# Patient Record
Sex: FEMALE | Race: WHITE | NOT HISPANIC OR LATINO | URBAN - METROPOLITAN AREA
[De-identification: names, ages, dates, MRNs, and addresses within clinical notes are randomized per-mention and may not be internally consistent; named-entity substitution may affect disease eponyms.]

---

## 2019-03-16 ENCOUNTER — INPATIENT (INPATIENT)
Facility: HOSPITAL | Age: 74
LOS: 0 days | Discharge: HOME | DRG: 440 | End: 2019-03-17
Attending: HOSPITALIST | Admitting: HOSPITALIST
Payer: COMMERCIAL

## 2019-03-16 VITALS
DIASTOLIC BLOOD PRESSURE: 76 MMHG | RESPIRATION RATE: 18 BRPM | TEMPERATURE: 98 F | OXYGEN SATURATION: 95 % | HEART RATE: 100 BPM | SYSTOLIC BLOOD PRESSURE: 126 MMHG

## 2019-03-16 DIAGNOSIS — Z90.49 ACQUIRED ABSENCE OF OTHER SPECIFIED PARTS OF DIGESTIVE TRACT: Chronic | ICD-10-CM

## 2019-03-16 DIAGNOSIS — K85.90 ACUTE PANCREATITIS WITHOUT NECROSIS OR INFECTION, UNSPECIFIED: ICD-10-CM

## 2019-03-16 DIAGNOSIS — E11.9 TYPE 2 DIABETES MELLITUS WITHOUT COMPLICATIONS: ICD-10-CM

## 2019-03-16 DIAGNOSIS — Z29.9 ENCOUNTER FOR PROPHYLACTIC MEASURES, UNSPECIFIED: ICD-10-CM

## 2019-03-16 DIAGNOSIS — R63.8 OTHER SYMPTOMS AND SIGNS CONCERNING FOOD AND FLUID INTAKE: ICD-10-CM

## 2019-03-16 DIAGNOSIS — Z91.89 OTHER SPECIFIED PERSONAL RISK FACTORS, NOT ELSEWHERE CLASSIFIED: ICD-10-CM

## 2019-03-16 DIAGNOSIS — J43.9 EMPHYSEMA, UNSPECIFIED: ICD-10-CM

## 2019-03-16 LAB
ALBUMIN SERPL ELPH-MCNC: 3.2 G/DL — LOW (ref 3.4–5)
ALP SERPL-CCNC: 71 U/L — SIGNIFICANT CHANGE UP (ref 40–120)
ALT FLD-CCNC: 39 U/L — SIGNIFICANT CHANGE UP (ref 12–42)
ANION GAP SERPL CALC-SCNC: 12 MMOL/L — SIGNIFICANT CHANGE UP (ref 9–16)
APPEARANCE UR: CLEAR — SIGNIFICANT CHANGE UP
AST SERPL-CCNC: 37 U/L — SIGNIFICANT CHANGE UP (ref 15–37)
BASOPHILS NFR BLD AUTO: 0.3 % — SIGNIFICANT CHANGE UP (ref 0–2)
BILIRUB SERPL-MCNC: 0.8 MG/DL — SIGNIFICANT CHANGE UP (ref 0.2–1.2)
BILIRUB UR-MCNC: NEGATIVE — SIGNIFICANT CHANGE UP
BUN SERPL-MCNC: 23 MG/DL — SIGNIFICANT CHANGE UP (ref 7–23)
CALCIUM SERPL-MCNC: 8.6 MG/DL — SIGNIFICANT CHANGE UP (ref 8.5–10.5)
CHLORIDE SERPL-SCNC: 98 MMOL/L — SIGNIFICANT CHANGE UP (ref 96–108)
CO2 SERPL-SCNC: 22 MMOL/L — SIGNIFICANT CHANGE UP (ref 22–31)
COLOR SPEC: YELLOW — SIGNIFICANT CHANGE UP
CREAT SERPL-MCNC: 0.85 MG/DL — SIGNIFICANT CHANGE UP (ref 0.5–1.3)
DIFF PNL FLD: NEGATIVE — SIGNIFICANT CHANGE UP
EOSINOPHIL NFR BLD AUTO: 0.2 % — SIGNIFICANT CHANGE UP (ref 0–6)
GLUCOSE SERPL-MCNC: 363 MG/DL — HIGH (ref 70–99)
GLUCOSE UR QL: >=1000
HCT VFR BLD CALC: 49.8 % — HIGH (ref 34.5–45)
HGB BLD-MCNC: 16.2 G/DL — HIGH (ref 11.5–15.5)
IMM GRANULOCYTES NFR BLD AUTO: 0.5 % — SIGNIFICANT CHANGE UP (ref 0–1.5)
KETONES UR-MCNC: NEGATIVE — SIGNIFICANT CHANGE UP
LEUKOCYTE ESTERASE UR-ACNC: NEGATIVE — SIGNIFICANT CHANGE UP
LIDOCAIN IGE QN: >1500 U/L — HIGH (ref 73–393)
LYMPHOCYTES # BLD AUTO: 8.8 % — LOW (ref 13–44)
MAGNESIUM SERPL-MCNC: 1.6 MG/DL — SIGNIFICANT CHANGE UP (ref 1.6–2.6)
MCHC RBC-ENTMCNC: 27.8 PG — SIGNIFICANT CHANGE UP (ref 27–34)
MCHC RBC-ENTMCNC: 32.5 G/DL — SIGNIFICANT CHANGE UP (ref 32–36)
MCV RBC AUTO: 85.4 FL — SIGNIFICANT CHANGE UP (ref 80–100)
MONOCYTES NFR BLD AUTO: 5.6 % — SIGNIFICANT CHANGE UP (ref 2–14)
NEUTROPHILS NFR BLD AUTO: 84.6 % — HIGH (ref 43–77)
NITRITE UR-MCNC: NEGATIVE — SIGNIFICANT CHANGE UP
PH UR: 5.5 — SIGNIFICANT CHANGE UP (ref 5–8)
PLATELET # BLD AUTO: 132 K/UL — LOW (ref 150–400)
POTASSIUM SERPL-MCNC: 4.3 MMOL/L — SIGNIFICANT CHANGE UP (ref 3.5–5.3)
POTASSIUM SERPL-SCNC: 4.3 MMOL/L — SIGNIFICANT CHANGE UP (ref 3.5–5.3)
PROT SERPL-MCNC: 8.1 G/DL — SIGNIFICANT CHANGE UP (ref 6.4–8.2)
PROT UR-MCNC: 30 MG/DL
RBC # BLD: 5.83 M/UL — HIGH (ref 3.8–5.2)
RBC # FLD: 13.1 % — SIGNIFICANT CHANGE UP (ref 10.3–14.5)
SODIUM SERPL-SCNC: 132 MMOL/L — SIGNIFICANT CHANGE UP (ref 132–145)
SP GR SPEC: 1.02 — SIGNIFICANT CHANGE UP (ref 1–1.03)
UROBILINOGEN FLD QL: 0.2 E.U./DL — SIGNIFICANT CHANGE UP
WBC # BLD: 6.6 K/UL — SIGNIFICANT CHANGE UP (ref 3.8–10.5)
WBC # FLD AUTO: 6.6 K/UL — SIGNIFICANT CHANGE UP (ref 3.8–10.5)

## 2019-03-16 PROCEDURE — 76705 ECHO EXAM OF ABDOMEN: CPT | Mod: 26

## 2019-03-16 PROCEDURE — 99285 EMERGENCY DEPT VISIT HI MDM: CPT

## 2019-03-16 PROCEDURE — 99223 1ST HOSP IP/OBS HIGH 75: CPT | Mod: GC

## 2019-03-16 PROCEDURE — 74177 CT ABD & PELVIS W/CONTRAST: CPT | Mod: 26

## 2019-03-16 RX ORDER — KETOROLAC TROMETHAMINE 30 MG/ML
15 SYRINGE (ML) INJECTION ONCE
Qty: 0 | Refills: 0 | Status: DISCONTINUED | OUTPATIENT
Start: 2019-03-16 | End: 2019-03-16

## 2019-03-16 RX ORDER — ONDANSETRON 8 MG/1
4 TABLET, FILM COATED ORAL ONCE
Qty: 0 | Refills: 0 | Status: COMPLETED | OUTPATIENT
Start: 2019-03-16 | End: 2019-03-16

## 2019-03-16 RX ORDER — SODIUM CHLORIDE 9 MG/ML
1000 INJECTION, SOLUTION INTRAVENOUS
Qty: 0 | Refills: 0 | Status: DISCONTINUED | OUTPATIENT
Start: 2019-03-16 | End: 2019-03-17

## 2019-03-16 RX ORDER — SODIUM CHLORIDE 9 MG/ML
1000 INJECTION INTRAMUSCULAR; INTRAVENOUS; SUBCUTANEOUS ONCE
Qty: 0 | Refills: 0 | Status: COMPLETED | OUTPATIENT
Start: 2019-03-16 | End: 2019-03-16

## 2019-03-16 RX ORDER — SODIUM CHLORIDE 9 MG/ML
1000 INJECTION INTRAMUSCULAR; INTRAVENOUS; SUBCUTANEOUS
Qty: 0 | Refills: 0 | Status: DISCONTINUED | OUTPATIENT
Start: 2019-03-16 | End: 2019-03-17

## 2019-03-16 RX ORDER — DEXTROSE 50 % IN WATER 50 %
25 SYRINGE (ML) INTRAVENOUS ONCE
Qty: 0 | Refills: 0 | Status: DISCONTINUED | OUTPATIENT
Start: 2019-03-16 | End: 2019-03-17

## 2019-03-16 RX ORDER — DEXTROSE 50 % IN WATER 50 %
15 SYRINGE (ML) INTRAVENOUS ONCE
Qty: 0 | Refills: 0 | Status: DISCONTINUED | OUTPATIENT
Start: 2019-03-16 | End: 2019-03-17

## 2019-03-16 RX ORDER — DEXTROSE 50 % IN WATER 50 %
12.5 SYRINGE (ML) INTRAVENOUS ONCE
Qty: 0 | Refills: 0 | Status: DISCONTINUED | OUTPATIENT
Start: 2019-03-16 | End: 2019-03-17

## 2019-03-16 RX ORDER — HYDROMORPHONE HYDROCHLORIDE 2 MG/ML
1 INJECTION INTRAMUSCULAR; INTRAVENOUS; SUBCUTANEOUS ONCE
Qty: 0 | Refills: 0 | Status: DISCONTINUED | OUTPATIENT
Start: 2019-03-16 | End: 2019-03-16

## 2019-03-16 RX ORDER — MORPHINE SULFATE 50 MG/1
4 CAPSULE, EXTENDED RELEASE ORAL ONCE
Qty: 0 | Refills: 0 | Status: DISCONTINUED | OUTPATIENT
Start: 2019-03-16 | End: 2019-03-16

## 2019-03-16 RX ORDER — INSULIN LISPRO 100/ML
VIAL (ML) SUBCUTANEOUS
Qty: 0 | Refills: 0 | Status: DISCONTINUED | OUTPATIENT
Start: 2019-03-17 | End: 2019-03-17

## 2019-03-16 RX ORDER — METOCLOPRAMIDE HCL 10 MG
10 TABLET ORAL ONCE
Qty: 0 | Refills: 0 | Status: COMPLETED | OUTPATIENT
Start: 2019-03-16 | End: 2019-03-16

## 2019-03-16 RX ORDER — GLUCAGON INJECTION, SOLUTION 0.5 MG/.1ML
1 INJECTION, SOLUTION SUBCUTANEOUS ONCE
Qty: 0 | Refills: 0 | Status: DISCONTINUED | OUTPATIENT
Start: 2019-03-16 | End: 2019-03-17

## 2019-03-16 RX ORDER — HEPARIN SODIUM 5000 [USP'U]/ML
5000 INJECTION INTRAVENOUS; SUBCUTANEOUS EVERY 8 HOURS
Qty: 0 | Refills: 0 | Status: DISCONTINUED | OUTPATIENT
Start: 2019-03-16 | End: 2019-03-17

## 2019-03-16 RX ORDER — IOHEXOL 300 MG/ML
30 INJECTION, SOLUTION INTRAVENOUS ONCE
Qty: 0 | Refills: 0 | Status: COMPLETED | OUTPATIENT
Start: 2019-03-16 | End: 2019-03-16

## 2019-03-16 RX ADMIN — HYDROMORPHONE HYDROCHLORIDE 1 MILLIGRAM(S): 2 INJECTION INTRAMUSCULAR; INTRAVENOUS; SUBCUTANEOUS at 17:32

## 2019-03-16 RX ADMIN — Medication 15 MILLIGRAM(S): at 18:40

## 2019-03-16 RX ADMIN — SODIUM CHLORIDE 2000 MILLILITER(S): 9 INJECTION INTRAMUSCULAR; INTRAVENOUS; SUBCUTANEOUS at 16:12

## 2019-03-16 RX ADMIN — MORPHINE SULFATE 4 MILLIGRAM(S): 50 CAPSULE, EXTENDED RELEASE ORAL at 16:24

## 2019-03-16 RX ADMIN — ONDANSETRON 4 MILLIGRAM(S): 8 TABLET, FILM COATED ORAL at 18:40

## 2019-03-16 RX ADMIN — ONDANSETRON 4 MILLIGRAM(S): 8 TABLET, FILM COATED ORAL at 16:12

## 2019-03-16 RX ADMIN — IOHEXOL 30 MILLILITER(S): 300 INJECTION, SOLUTION INTRAVENOUS at 16:12

## 2019-03-16 RX ADMIN — Medication 15 MILLIGRAM(S): at 16:12

## 2019-03-16 RX ADMIN — SODIUM CHLORIDE 2000 MILLILITER(S): 9 INJECTION INTRAMUSCULAR; INTRAVENOUS; SUBCUTANEOUS at 17:32

## 2019-03-16 RX ADMIN — SODIUM CHLORIDE 300 MILLILITER(S): 9 INJECTION INTRAMUSCULAR; INTRAVENOUS; SUBCUTANEOUS at 23:48

## 2019-03-16 RX ADMIN — Medication 10 MILLIGRAM(S): at 19:21

## 2019-03-16 RX ADMIN — MORPHINE SULFATE 4 MILLIGRAM(S): 50 CAPSULE, EXTENDED RELEASE ORAL at 18:40

## 2019-03-16 RX ADMIN — HYDROMORPHONE HYDROCHLORIDE 1 MILLIGRAM(S): 2 INJECTION INTRAMUSCULAR; INTRAVENOUS; SUBCUTANEOUS at 18:40

## 2019-03-16 NOTE — ED ADULT NURSE NOTE - NSIMPLEMENTINTERV_GEN_ALL_ED
Implemented All Universal Safety Interventions:  [place] to call system. Call bell, personal items and telephone within reach. Instruct patient to call for assistance. Room bathroom lighting operational. Non-slip footwear when patient is off stretcher. Physically safe environment: no spills, clutter or unnecessary equipment. Stretcher in lowest position, wheels locked, appropriate side rails in place.

## 2019-03-16 NOTE — H&P ADULT - NSICDXPROBLEM_GEN_ALL_CORE_FT
PROBLEM DIAGNOSES  Problem: Acute pancreatitis  Assessment and Plan:     Problem: Nutrition, metabolism, and development symptoms  Assessment and Plan: -  -Will replete to K>4 and Mg>2  -  -Dispo RMF    Problem: Need for prophylactic measure  Assessment and Plan: -HSQ  -No GI prophylaxis indicated    Problem: Transition of care performed with sharing of clinical summary  Assessment and Plan: PROBLEM DIAGNOSES  Problem: Diabetes mellitus  Assessment and Plan: -Patient with history of diabetes mellitus, takes  -ISS    Problem: Emphysema lung  Assessment and Plan: -Patient with history of epmhysema, takes    Problem: Acute pancreatitis  Assessment and Plan: -Patient presenting with 1 day of abdominal pain, right sided, lipase greater than 1500  -Patient     Problem: Nutrition, metabolism, and development symptoms  Assessment and Plan: -  -Will replete to K>4 and Mg>2  -  -Dispo RMF    Problem: Need for prophylactic measure  Assessment and Plan: -HSQ  -No GI prophylaxis indicated    Problem: Transition of care performed with sharing of clinical summary  Assessment and Plan: PROBLEM DIAGNOSES  Problem: Acute pancreatitis  Assessment and Plan: -At home today, she had an episode of watery diarrhea so she went to get medicine to stop the diarrhea and when she returned to her apartment, she had another episode.  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  Lipase was greater than 1500 and CT abdomen and pelvis with oral and IV contrast showed questionable interstitial edema and slight prominence of the pancreatic head suggesting acute pancreatitis.  No obstructing stone shown on ultrasound.  No alcohol use.  -Surgery following, will appreciate recs  -NS at 300cc/hour  -Morphine PRN for pain  -Follow up triglycerides    Problem: Right nephrolithiasis  Assessment and Plan: -Patient with history of nonobstructing stone in R kidney but was told NTD at this point.  Causes intermittent R flank pain for her.  Today, states that pain started in abdomen and radiated to back.  No evidence of infection (negative UA, no WBC, no fever), would continue to monitor and encourage follow up with urologist.    Problem: Asthma  Assessment and Plan: -Patient endorses history of asthma after inhaling fumes from 9/11.  Takes 1 pump and 2 inhalers but does not recall the names.  Denies any shortness of breath, is saturating well and not having any wheezing on physical exam.  -Obtain collateral from pharmacy about name of medications (Uses [facility] on [address]) and restart    Problem: Diabetes mellitus  Assessment and Plan: -Patient with history of diabetes mellitus, takes an oral diabetic agent put does not recall name  -ISS  -Consistant carbohydate diet  -Obtain collateral from pharmacy about name of medications (Uses [facility] on [address])    Problem: Nutrition, metabolism, and development symptoms  Assessment and Plan: -NS at 300cc/hour   -Will replete to K>4 and Mg>2  -Patient has not eaten all day, will start with full liquid consistent carbohydrate diet and advance as tolerated  -Dispo RMF  -DNR/DNI    Problem: Need for prophylactic measure  Assessment and Plan: -HSQ  -No GI prophylaxis indicated    Problem: Transition of care performed with sharing of clinical summary  Assessment and Plan: -Patient follows with Dr. Leyva at [facility] (does not recall first name) PROBLEM DIAGNOSES  Problem: Acute pancreatitis  Assessment and Plan: -At home today, patient with multiple episodes of watery diarrhea followed by right sided abdominal pain, sharp, radiating to the back, 10/10 in severity accompanied by about 5 episodes of NBNB vomiting.  Lipase was greater than 1500 and CT abdomen and pelvis with oral and IV contrast showed questionable interstitial edema and slight prominence of the pancreatic head suggesting acute pancreatitis.  No obstructing stone shown on ultrasound.  No alcohol use.  -Surgery following, will appreciate recs  -NS at 300cc/hour  -Morphine PRN for pain  -Follow up triglycerides    Problem: Right nephrolithiasis  Assessment and Plan: -Patient with history of nonobstructing stone in R kidney but was told NTD at this point.  Causes intermittent R flank pain for her.  Today, states that pain started in abdomen and radiated to back.  No evidence of infection (negative UA, no WBC, no fever), would continue to monitor and encourage follow up with urologist.    Problem: Asthma  Assessment and Plan: -Patient endorses history of asthma after inhaling fumes from 9/11.  Takes 1 pump and 2 inhalers but does not recall the names.  Denies any shortness of breath, is saturating well and not having any wheezing on physical exam.  -Obtain collateral from pharmacy about name of medications (Uses [facility] on [address]) and restart    Problem: Diabetes mellitus  Assessment and Plan: -Patient with history of diabetes mellitus, takes an oral diabetic agent put does not recall name  -ISS  -Consistant carbohydate diet  -Obtain collateral from pharmacy about name of medications (Uses [facility] on [address])    Problem: Nutrition, metabolism, and development symptoms  Assessment and Plan: -NS at 300cc/hour   -Will replete to K>4 and Mg>2  -Patient has not eaten all day, will start with full liquid consistent carbohydrate diet and advance as tolerated  -Dispo RMF  -DNR/DNI    Problem: Need for prophylactic measure  Assessment and Plan: -HSQ  -No GI prophylaxis indicated    Problem: Transition of care performed with sharing of clinical summary  Assessment and Plan: -Patient follows with Dr. Leyva at [facility] (does not recall first name) PROBLEM DIAGNOSES  Problem: Acute pancreatitis  Assessment and Plan: -At home today, patient with multiple episodes of watery diarrhea followed by right sided abdominal pain, sharp, radiating to the back, 10/10 in severity accompanied by about 5 episodes of NBNB vomiting.  Lipase was greater than 1500 and CT abdomen and pelvis with oral and IV contrast showed questionable interstitial edema and slight prominence of the pancreatic head suggesting acute pancreatitis.  No obstructing stone shown on ultrasound.  No alcohol use.  -Surgery following, will appreciate recs  -LR at 300cc/hour  -Morphine PRN for pain  -Follow up AM lipid panel  -Obtain collateral about medications, possible iatrogenic cause of pancreatitis    Problem: Right nephrolithiasis  Assessment and Plan: -Patient with history of nonobstructing stone in R kidney but was told NTD at this point.  Causes intermittent R flank pain for her.  Today, states that pain started in abdomen and radiated to back.  No evidence of infection (negative UA, no WBC, no fever), would continue to monitor and encourage follow up with urologist.    Problem: Asthma  Assessment and Plan: -Patient endorses history of asthma after inhaling fumes from 9/11.  Takes 1 pump and 2 inhalers but does not recall the names.  Denies any shortness of breath, is saturating well and not having any wheezing on physical exam.  -Obtain collateral from pharmacy about name of medications (Uses [facility] on [address]) and restart    Problem: Diabetes mellitus  Assessment and Plan: -Patient with history of diabetes mellitus, takes an oral diabetic agent put does not recall name  -ISS  -Consistant carbohydate diet  -Obtain collateral from pharmacy about name of medications (Uses [facility] on [address])    Problem: Thrombocytosis  Assessment and Plan: -Patient found to have platelets of 132 on admission, no signs/symptoms of active bleed, hemodynamically stable  -Trend CBC, transfuse for platelets under 10 or under 50 and bleeding    Problem: Nutrition, metabolism, and development symptoms  Assessment and Plan: -LR at 300cc/hour   -Will replete to K>4 and Mg>2  -Patient has not eaten all day, will start with full liquid consistent carbohydrate diet and advance as tolerated  -Dispo RMF  -DNR/DNI    Problem: Need for prophylactic measure  Assessment and Plan: -HSQ  -No GI prophylaxis indicated    Problem: Transition of care performed with sharing of clinical summary  Assessment and Plan: -Patient follows with Dr. Leyva at [facility] (does not recall first name)

## 2019-03-16 NOTE — H&P ADULT - ASSESSMENT
73 year old female with PMH cholecystectomy, asthma (from inhaling fumes from 9/11), DM and nephrolithiasis who presents with 1 day of right flank pain, right abdominal pain, vomiting and diarrhea.

## 2019-03-16 NOTE — ED PROVIDER NOTE - PHYSICAL EXAMINATION
VITAL SIGNS: I have reviewed nursing notes and confirm.  CONSTITUTIONAL: Well-developed; well-nourished; in no acute distress.  SKIN: Remainder of skin exam is warm and dry, no acute rash.  HEAD: Normocephalic; atraumatic.  EYES: PERRL, EOM intact; conjunctiva and sclera clear.  ENT: No nasal discharge; airway clear.  NECK: Supple; non tender.  CARD: S1, S2 normal; no murmurs, gallops, or rubs. Regular rate and rhythm.  RESP: No wheezes, rales or rhonchi.  ABD: RUQ/RLQ tenderness with no guarding  : Right sided CVA tenderness  EXT: Normal ROM. No clubbing, cyanosis or edema.  NEURO: Alert, oriented. Grossly unremarkable.  PSYCH: Cooperative, appropriate.

## 2019-03-16 NOTE — H&P ADULT - NSHPPHYSICALEXAM_GEN_ALL_CORE
Constitutional: WDWN resting comfortably in bed; NAD  Head: NC/AT  Eyes: PERRL, EOMI, anicteric sclera  ENT: no nasal discharge; uvula midline, no oropharyngeal erythema or exudates; MMM  Neck: supple; no JVD or thyromegaly  Respiratory: CTA B/L; no W/R/R, no retractions  Cardiac: +S1/S2; RRR; no M/R/G; PMI non-displaced  Gastrointestinal: soft, NT/ND; no rebound or guarding; +BSx4  Genitourinary: normal external genitalia  Back: spine midline, no bony tenderness or step-offs; no CVAT B/L  Extremities: WWP, no clubbing or cyanosis; no peripheral edema  Musculoskeletal: NROM x4; no joint swelling, tenderness or erythema  Vascular: 2+ radial, femoral, DP/PT pulses B/L  Dermatologic: skin warm, dry and intact; no rashes, wounds, or scars  Lymphatic: no submandibular or cervical LAD  Neurologic: AAOx3; CNII-XII grossly intact; no focal deficits  Psychiatric: affect and characteristics of appearance, verbalizations, behaviors are appropriate Constitutional: WDWN resting comfortably in bed; NAD  Head: NC/AT  Eyes: PERRL, EOMI, anicteric sclera  ENT: no nasal discharge; uvula midline, no oropharyngeal erythema or exudates; MMM  Neck: supple; no JVD or thyromegaly  Respiratory: CTA B/L; no W/R/R, no retractions  Cardiac: +S1/S2; RRR; no M/R/G; PMI non-displaced  Gastrointestinal: soft, NT/ND; no rebound or guarding; +BSx4  Extremities: WWP, no clubbing or cyanosis; no peripheral edema  Musculoskeletal: NROM x4; no joint swelling, tenderness or erythema  Vascular: 2+ radial, femoral, DP/PT pulses B/L  Dermatologic: skin warm, dry and intact; no rashes, wounds, or scars  Neurologic: AAOx3; CNII-XII grossly intact; no focal deficits Constitutional: WDWN resting comfortably in bed; NAD  Head: NC/AT  Eyes: PERRL, EOMI, anicteric sclera  ENT: no nasal discharge; uvula midline, no oropharyngeal erythema or exudates; dry mucous membranes  Neck: supple; no JVD or thyromegaly  Respiratory: CTA B/L; no W/R/R, no retractions  Cardiac: +S1/S2; RRR; no M/R/G; PMI non-displaced  Gastrointestinal: soft, tenderness to palpation of RLQ/ND; no rebound or guarding; +BSx4  Extremities: WWP, no clubbing or cyanosis; no peripheral edema  Musculoskeletal: NROM x4; no joint swelling, tenderness or erythema  Genitourinary: R CVA tenderness  Vascular: 2+ radial, femoral, DP/PT pulses B/L  Dermatologic: skin warm, dry and intact; no rashes, wounds, or scars  Neurologic: AAOx3; CNII-XII grossly intact; no focal deficits

## 2019-03-16 NOTE — CONSULT NOTE ADULT - SUBJECTIVE AND OBJECTIVE BOX
74 yo F w/ hx cholecystectomy (10 years ago), emphysema, DM, nephrolithiasis, for which surgery is being consulted for pancreatitis. Yesterday patient began to have non-bloody diarrhea, nausea, non-bloody emesis, and R flank/abd pain. Endorses subjective fevers. Denies CP or SOB. Does not drink alcohol. Has never had pancreatitis before.     Vital Signs Last 24 Hrs  T(C): 36.8 (16 Mar 2019 22:26), Max: 36.8 (16 Mar 2019 22:26)  T(F): 98.3 (16 Mar 2019 22:26), Max: 98.3 (16 Mar 2019 22:26)  HR: 83 (16 Mar 2019 22:26) (83 - 100)  BP: 121/71 (16 Mar 2019 22:26) (121/71 - 159/81)  BP(mean): --  RR: 16 (16 Mar 2019 22:26) (16 - 20)  SpO2: 93% (16 Mar 2019 22:26) (93% - 95%)    Exam:   General: NAD  Pulm: normal resp effort and excursion  Abd: soft, non-distended, RLQ TTP, no guarding                           16.2   6.6   )-----------( 132      ( 16 Mar 2019 16:26 )             49.8   03-16    132  |  98  |  23  ----------------------------<  363<H>  4.3   |  22  |  0.85    Ca    8.6      16 Mar 2019 16:26  Mg     1.6     03-16    TPro  8.1  /  Alb  3.2<L>  /  TBili  0.8  /  DBili  x   /  AST  37  /  ALT  39  /  AlkPhos  71  03-16    Lipase >1500        CT abd: slight prominence of pancreatic head, no collection; mild intrahepatic ductal dilation, no stones  US: heterogenous pancreas w/ mild ductal dilation. CBD 0.8 cm

## 2019-03-16 NOTE — H&P ADULT - HISTORY OF PRESENT ILLNESS
73 year old female with PMH cholecystectomu, emphysema, DM and nephrolithiasis who presents with 1 day of right flank pain, right abdominal pain, vomiting and diarrhea.  Upon arrival to the ED, vital signs were /76, , RR 18, temperature 98.1 degrees Farenheit and saturating 95% on room air.  Lipase was greater than 1500 and CT abdomen and pelvis with oral and IV contrast showed questionable interstitial edema and slight prominence of the pancreatic head suggesting acute pancreatitis.  No obstructing stone shown on ultrasound.  Patient admitted for acute pancreatitis. 73 year old female with PMH cholecystectomy, asthma (from inhaling fumes from 9/11), DM and nephrolithiasis who presents with 1 day of right flank pain, right abdominal pain, vomiting and diarrhea.  Of note, 6 months ago the patient was diagnosed with R sided nephrolithiasis but was told it was nonobstructive and nothing was done about it.  Occasionally, it does cause her to have pain.  At home today, she had an episode of watery diarrhea so she went to get medicine to stop the diarrhea and when she returned to her apartment, she had another episode.  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  Upon arrival to the ED, vital signs were /76, , RR 18, temperature 98.1 degrees Farenheit and saturating 95% on room air.  Lipase was greater than 1500 and CT abdomen and pelvis with oral and IV contrast showed questionable interstitial edema and slight prominence of the pancreatic head suggesting acute pancreatitis.  No obstructing stone shown on ultrasound.  Acute pancreatitis [K85.90]  -At home today, she had an episode of watery diarrhea so she went to get medicine to stop the diarrhea and when she returned to her apartment, she had another episode.  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  Lipase was greater than 1500 and CT abdomen and pelvis with oral and IV contrast showed questionable interstitial edema and slight prominence of the pancreatic head.  She received 2L NS, 1mg Dilaudid, Toradol 15mg, Reglan 10mg, Zofran 4mg x2 and morphine 4mg and was admitted for acute pancreatitis 73 year old female with PMH cholecystectomy, asthma (from inhaling fumes from 9/11), DM and nephrolithiasis who presents with 1 day of right flank pain, right abdominal pain, vomiting and diarrhea.  Of note, 6 months ago the patient was diagnosed with R sided nephrolithiasis but was told it was nonobstructive and nothing was done about it.  Occasionally, it does cause her to have pain.  At home today, she had an episode of watery diarrhea so she went to get medicine to stop the diarrhea and when she returned to her apartment, she had another episode.  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  Upon arrival to the ED, vital signs were /76, , RR 18, temperature 98.1 degrees Farenheit and saturating 95% on room air.  Lipase was greater than 1500 and CT abdomen and pelvis with oral and IV contrast showed questionable interstitial edema and slight prominence of the pancreatic head suggesting acute pancreatitis.  No obstructing stone shown on ultrasound.  She received 2L NS, 1mg Dilaudid, Toradol 15mg, Reglan 10mg, Zofran 4mg x2 and morphine 4mg and was admitted for acute pancreatitis

## 2019-03-16 NOTE — ED PROVIDER NOTE - OBJECTIVE STATEMENT
72 y/o Female with PMHx of Cholecystectomy, Emphysema, NIDDM and recent dx of Nephrolithiasis, went to [facility] for kidney stones, were referred to Urologist, went to the Urologist and were dx with nephrolithiasis. Pt presents to the ED c/o right flank and abdominal pain that started last night. This past week, pt reports vomiting and diarrhea episodes, chills in the morning, and involuntary urination and diarrhea. Pt reports no blood in the vomit. Denies leg swelling and leg pain. Denies colon infections and appendicitis. Denies any heart conditions. Pt notes taking OTC drugs for nausea. 72 y/o Female with PMHx of Cholecystectomy, Emphysema, NIDDM and recent dx of Nephrolithiasis, went to [facility] for right flank and right abdominal pain and were diagnosed with right kidney stones which did not need any interventions at the time. Pt was referred to a  Urologist which she did follow up with. Pt presents to the ED c/o right flank and right abdominal pain that started last night. This past week, pt reports vomiting and diarrhea episodes, chills in the morning, and urinary frequency and urgency and diarrhea. Pt reports NBNB vomitus. Denies leg swelling and leg pain. Denies history of colon infections and appendicitis. Denies any heart conditions. Pt notes taking OTC drugs for nausea.

## 2019-03-16 NOTE — CONSULT NOTE ADULT - ATTENDING COMMENTS
Patient seen and examined, scan reviewed.  She feels much less pain than yesterday, still has some in back.  AFVSS  Abd soft, ND NT    Non-severe pancreatitis. Prior cholecystectomy and absence of biliary markers for obstruction rule out gallstone etiology.  Medication/viral etiologies possible.  Neoplasm also a possibility.  Limited po intake, IV hydration, pain management.    Reconsult prn.

## 2019-03-16 NOTE — H&P ADULT - NSICDXFAMILYHX_GEN_ALL_CORE_FT
FAMILY HISTORY:  FH: breast cancer, sister  FH: myocardial infarction, father  FHx: liver cancer, sister

## 2019-03-16 NOTE — H&P ADULT - ATTENDING COMMENTS
Pt seen and examined at bedside on 3/16/2019 @ 6255    Agree with HPI, ROS as above.     VS, Labs, FH, SH, allergies, medications, imaging reviewed. I personally discussed this case with Dr. Fonseca - patient with acute pancreatitis as imaged on CT with concomitant elevated lipase, surgery consulted in ED for possible retained gallstone. I personally reviewed the EKG - NSR. Agree with physical exam as above     A/P: 73 year old female with PMH cholecystectomy, asthma (from inhaling fumes from 9/11), DM and nephrolithiasis who presents with 1 day of right flank pain, right abdominal pain, vomiting and diarrhea.   **Pancreatitis  -Unclear etiology  -GI consult in AM for possible ERCP/MRCP  -Check lipid panel  -C/w LR @ 300 cc/hr  -Pain control    Plan otherwise as outlined above.....

## 2019-03-16 NOTE — H&P ADULT - NSHPLABSRESULTS_GEN_ALL_CORE
.  LABS:                         16.2   6.6   )-----------( 132      ( 16 Mar 2019 16:26 )             49.8         132  |  98  |  23  ----------------------------<  363<H>  4.3   |  22  |  0.85    Ca    8.6      16 Mar 2019 16:26  Mg     1.6         TPro  8.1  /  Alb  3.2<L>  /  TBili  0.8  /  DBili  x   /  AST  37  /  ALT  39  /  AlkPhos  71        Urinalysis Basic - ( 16 Mar 2019 19:16 )    Color: Yellow / Appearance: Clear / S.020 / pH: x  Gluc: x / Ketone: NEGATIVE  / Bili: NEGATIVE / Urobili: 0.2 E.U./dL   Blood: x / Protein: 30 mg/dL / Nitrite: NEGATIVE   Leuk Esterase: NEGATIVE / RBC: < 5 /HPF / WBC < 5 /HPF   Sq Epi: x / Non Sq Epi: 0-5 /HPF / Bacteria: Few /HPF                RADIOLOGY, EKG & ADDITIONAL TESTS: Reviewed.

## 2019-03-16 NOTE — H&P ADULT - NSHPSOCIALHISTORY_GEN_ALL_CORE
Denies tobacco, alcohol or drug use Former smoker 1-2 cigarettes per day for 10 years, quit 40 years ago, denies alcohol or drug use

## 2019-03-16 NOTE — H&P ADULT - NSHPREVIEWOFSYSTEMS_GEN_ALL_CORE
CONSTITUTIONAL: No weakness, fevers or chills  EYES/ENT: No visual changes;  No vertigo or throat pain   NECK: No pain or stiffness  RESPIRATORY: No cough, wheezing, hemoptysis; No shortness of breath  CARDIOVASCULAR: No chest pain or palpitations  GASTROINTESTINAL: No abdominal or epigastric pain. No nausea, vomiting, or hematemesis; No diarrhea or constipation. No melena or hematochezia.  GENITOURINARY: No dysuria, frequency or hematuria  NEUROLOGICAL: No numbness or weakness  SKIN: No itching, burning, rashes, or lesions   All other review of systems is negative unless indicated above. CONSTITUTIONAL: No weakness, fevers or chills  EYES/ENT: No visual changes;  No vertigo or throat pain   NECK: No pain or stiffness  RESPIRATORY: No cough, wheezing, hemoptysis; No shortness of breath  CARDIOVASCULAR: No chest pain or palpitations  GASTROINTESTINAL: Endorses abdominal pain with nausea and vomiting, or hematemesis; No diarrhea or constipation. No melena or hematochezia.  GENITOURINARY: No dysuria, frequency or hematuria  NEUROLOGICAL: No numbness or weakness  SKIN: No itching, burning, rashes, or lesions   All other review of systems is negative unless indicated above. CONSTITUTIONAL: No weakness, fevers or chills  EYES/ENT: No visual changes;  No vertigo or throat pain   NECK: No pain or stiffness  RESPIRATORY: No cough, wheezing, hemoptysis; No shortness of breath  CARDIOVASCULAR: No chest pain or palpitations  GASTROINTESTINAL: Endorses abdominal pain with nausea, vomiting and diarrhea (has resolved)  GENITOURINARY: No dysuria, frequency or hematuria  NEUROLOGICAL: No numbness or weakness  SKIN: No itching, burning, rashes, or lesions   All other review of systems is negative unless indicated above.

## 2019-03-16 NOTE — ED PROVIDER NOTE - CLINICAL SUMMARY MEDICAL DECISION MAKING FREE TEXT BOX
+ pancreatitis w/out evidence of acute biliary obstruction or infectious process. Pain well controlled, hydrated with 2L, tachycardia resolving. d/w surgical service, do not believe there is a case for surgical intervention. will admit to medical service, RMF as pt is HDS and comfortable.

## 2019-03-16 NOTE — CONSULT NOTE ADULT - ASSESSMENT
72 yo F w/ hx cholecystectomy (10 years ago), emphysema, DM, nephrolithiasis, with pancreatitis. Imaging and clinical hx does not suggest gallstones as cause.     - no surgical intervention at this time  - given patient's DM may be related to hypertriglyceridemia 72 yo F w/ hx cholecystectomy (10 years ago), emphysema, DM, nephrolithiasis, with pancreatitis. Imaging and clinical hx does not suggest gallstones as cause.     - surgical intervention unlikely at this time  - consult GI for further workup  - given patient's DM may be related to hypertriglyceridemia - recommend order triglycerides 74 yo F w/ hx cholecystectomy (10 years ago), emphysema, DM, nephrolithiasis, with pancreatitis. Imaging and clinical hx does not suggest gallstone causation.     - surgical intervention unlikely at this time  - consult GI for further workup  - given patient's DM may be related to hypertriglyceridemia - recommend order triglycerides  - discussed w/ chief resident on call 74 yo F w/ hx cholecystectomy (10 years ago), emphysema, DM, nephrolithiasis, with pancreatitis. Imaging and clinical hx does not suggest gallstone causation.     - surgical intervention unlikely at this time  - consult GI for further workup  - needs full workup to exclude malignancy as cause of pancreatitis  - given patient's DM may be related to hypertriglyceridemia - recommend order triglycerides  - discussed w/ chief resident on call  - surgery will sign off, re-consult as needed

## 2019-03-17 VITALS
DIASTOLIC BLOOD PRESSURE: 76 MMHG | SYSTOLIC BLOOD PRESSURE: 119 MMHG | HEART RATE: 81 BPM | TEMPERATURE: 97 F | RESPIRATION RATE: 17 BRPM | OXYGEN SATURATION: 95 %

## 2019-03-17 DIAGNOSIS — D47.3 ESSENTIAL (HEMORRHAGIC) THROMBOCYTHEMIA: ICD-10-CM

## 2019-03-17 DIAGNOSIS — N20.0 CALCULUS OF KIDNEY: ICD-10-CM

## 2019-03-17 DIAGNOSIS — J45.909 UNSPECIFIED ASTHMA, UNCOMPLICATED: ICD-10-CM

## 2019-03-17 LAB
ANION GAP SERPL CALC-SCNC: 10 MMOL/L — SIGNIFICANT CHANGE UP (ref 5–17)
BUN SERPL-MCNC: 13 MG/DL — SIGNIFICANT CHANGE UP (ref 7–23)
CALCIUM SERPL-MCNC: 7.7 MG/DL — LOW (ref 8.4–10.5)
CHLORIDE SERPL-SCNC: 105 MMOL/L — SIGNIFICANT CHANGE UP (ref 96–108)
CHOLEST SERPL-MCNC: 149 MG/DL — SIGNIFICANT CHANGE UP (ref 10–199)
CO2 SERPL-SCNC: 23 MMOL/L — SIGNIFICANT CHANGE UP (ref 22–31)
CREAT SERPL-MCNC: 0.57 MG/DL — SIGNIFICANT CHANGE UP (ref 0.5–1.3)
GLUCOSE BLDC GLUCOMTR-MCNC: 177 MG/DL — HIGH (ref 70–99)
GLUCOSE BLDC GLUCOMTR-MCNC: 216 MG/DL — HIGH (ref 70–99)
GLUCOSE BLDC GLUCOMTR-MCNC: 218 MG/DL — HIGH (ref 70–99)
GLUCOSE SERPL-MCNC: 239 MG/DL — HIGH (ref 70–99)
HCT VFR BLD CALC: 37.6 % — SIGNIFICANT CHANGE UP (ref 34.5–45)
HDLC SERPL-MCNC: 39 MG/DL — LOW
HGB BLD-MCNC: 12.7 G/DL — SIGNIFICANT CHANGE UP (ref 11.5–15.5)
LIPID PNL WITH DIRECT LDL SERPL: 89 MG/DL — SIGNIFICANT CHANGE UP
MAGNESIUM SERPL-MCNC: 1.7 MG/DL — SIGNIFICANT CHANGE UP (ref 1.6–2.6)
MCHC RBC-ENTMCNC: 28.7 PG — SIGNIFICANT CHANGE UP (ref 27–34)
MCHC RBC-ENTMCNC: 33.8 GM/DL — SIGNIFICANT CHANGE UP (ref 32–36)
MCV RBC AUTO: 85.1 FL — SIGNIFICANT CHANGE UP (ref 80–100)
NRBC # BLD: 0 /100 WBCS — SIGNIFICANT CHANGE UP (ref 0–0)
PLATELET # BLD AUTO: 119 K/UL — LOW (ref 150–400)
POTASSIUM SERPL-MCNC: 3.7 MMOL/L — SIGNIFICANT CHANGE UP (ref 3.5–5.3)
POTASSIUM SERPL-SCNC: 3.7 MMOL/L — SIGNIFICANT CHANGE UP (ref 3.5–5.3)
RBC # BLD: 4.42 M/UL — SIGNIFICANT CHANGE UP (ref 3.8–5.2)
RBC # FLD: 12.8 % — SIGNIFICANT CHANGE UP (ref 10.3–14.5)
SODIUM SERPL-SCNC: 138 MMOL/L — SIGNIFICANT CHANGE UP (ref 135–145)
TOTAL CHOLESTEROL/HDL RATIO MEASUREMENT: 3.8 RATIO — SIGNIFICANT CHANGE UP (ref 3.3–7.1)
TRIGL SERPL-MCNC: 105 MG/DL — SIGNIFICANT CHANGE UP (ref 10–149)
TRIGL SERPL-MCNC: 169 MG/DL — HIGH (ref 10–149)
WBC # BLD: 3.95 K/UL — SIGNIFICANT CHANGE UP (ref 3.8–10.5)
WBC # FLD AUTO: 3.95 K/UL — SIGNIFICANT CHANGE UP (ref 3.8–10.5)

## 2019-03-17 PROCEDURE — 96375 TX/PRO/DX INJ NEW DRUG ADDON: CPT | Mod: XU

## 2019-03-17 PROCEDURE — 96376 TX/PRO/DX INJ SAME DRUG ADON: CPT

## 2019-03-17 PROCEDURE — 80048 BASIC METABOLIC PNL TOTAL CA: CPT

## 2019-03-17 PROCEDURE — 80053 COMPREHEN METABOLIC PANEL: CPT

## 2019-03-17 PROCEDURE — 99239 HOSP IP/OBS DSCHRG MGMT >30: CPT

## 2019-03-17 PROCEDURE — 36415 COLL VENOUS BLD VENIPUNCTURE: CPT

## 2019-03-17 PROCEDURE — 96374 THER/PROPH/DIAG INJ IV PUSH: CPT

## 2019-03-17 PROCEDURE — 83690 ASSAY OF LIPASE: CPT

## 2019-03-17 PROCEDURE — 74177 CT ABD & PELVIS W/CONTRAST: CPT

## 2019-03-17 PROCEDURE — 83735 ASSAY OF MAGNESIUM: CPT

## 2019-03-17 PROCEDURE — 85025 COMPLETE CBC W/AUTO DIFF WBC: CPT

## 2019-03-17 PROCEDURE — 84478 ASSAY OF TRIGLYCERIDES: CPT

## 2019-03-17 PROCEDURE — 82962 GLUCOSE BLOOD TEST: CPT

## 2019-03-17 PROCEDURE — 85027 COMPLETE CBC AUTOMATED: CPT

## 2019-03-17 PROCEDURE — 81001 URINALYSIS AUTO W/SCOPE: CPT

## 2019-03-17 PROCEDURE — 80061 LIPID PANEL: CPT

## 2019-03-17 PROCEDURE — 76705 ECHO EXAM OF ABDOMEN: CPT

## 2019-03-17 PROCEDURE — 99285 EMERGENCY DEPT VISIT HI MDM: CPT | Mod: 25

## 2019-03-17 RX ORDER — SODIUM CHLORIDE 9 MG/ML
1000 INJECTION, SOLUTION INTRAVENOUS
Qty: 0 | Refills: 0 | Status: DISCONTINUED | OUTPATIENT
Start: 2019-03-17 | End: 2019-03-17

## 2019-03-17 RX ORDER — MORPHINE SULFATE 50 MG/1
2 CAPSULE, EXTENDED RELEASE ORAL EVERY 4 HOURS
Qty: 0 | Refills: 0 | Status: DISCONTINUED | OUTPATIENT
Start: 2019-03-17 | End: 2019-03-17

## 2019-03-17 RX ORDER — POTASSIUM CHLORIDE 20 MEQ
40 PACKET (EA) ORAL ONCE
Qty: 0 | Refills: 0 | Status: COMPLETED | OUTPATIENT
Start: 2019-03-17 | End: 2019-03-17

## 2019-03-17 RX ORDER — ACETAMINOPHEN 500 MG
650 TABLET ORAL ONCE
Qty: 0 | Refills: 0 | Status: COMPLETED | OUTPATIENT
Start: 2019-03-17 | End: 2019-03-17

## 2019-03-17 RX ADMIN — Medication 1: at 17:24

## 2019-03-17 RX ADMIN — Medication 2: at 08:41

## 2019-03-17 RX ADMIN — HEPARIN SODIUM 5000 UNIT(S): 5000 INJECTION INTRAVENOUS; SUBCUTANEOUS at 14:00

## 2019-03-17 RX ADMIN — Medication 40 MILLIEQUIVALENT(S): at 08:41

## 2019-03-17 RX ADMIN — Medication 650 MILLIGRAM(S): at 16:24

## 2019-03-17 RX ADMIN — Medication 2: at 12:52

## 2019-03-17 RX ADMIN — HEPARIN SODIUM 5000 UNIT(S): 5000 INJECTION INTRAVENOUS; SUBCUTANEOUS at 06:50

## 2019-03-17 RX ADMIN — SODIUM CHLORIDE 150 MILLILITER(S): 9 INJECTION, SOLUTION INTRAVENOUS at 11:45

## 2019-03-17 RX ADMIN — SODIUM CHLORIDE 300 MILLILITER(S): 9 INJECTION, SOLUTION INTRAVENOUS at 06:50

## 2019-03-17 RX ADMIN — Medication 650 MILLIGRAM(S): at 17:20

## 2019-03-17 NOTE — DISCHARGE NOTE PROVIDER - NSDCCPCAREPLAN_GEN_ALL_CORE_FT
PRINCIPAL DISCHARGE DIAGNOSIS  Diagnosis: Idiopathic acute pancreatitis, unspecified complication status  Assessment and Plan of Treatment: On this admission, you were found to have acute pancreatitis. This was mild in nature and you improved on with fluids and pain control. You should follow up with your primary care provider this week.      SECONDARY DISCHARGE DIAGNOSES  Diagnosis: Diabetes mellitus  Assessment and Plan of Treatment: You have a history of diabetes. Please continue all of your home medications and follow up with your primary care provider.    Diagnosis: Right nephrolithiasis  Assessment and Plan of Treatment: You have a history of kidney stones. Currently your pain is from pancreatitis and not from kidney stones.    Diagnosis: Asthma  Assessment and Plan of Treatment: You have a history of asthma. Continue with all of your home medications.

## 2019-03-17 NOTE — DISCHARGE NOTE NURSING/CASE MANAGEMENT/SOCIAL WORK - NSDCDPATPORTLINK_GEN_ALL_CORE
You can access the [email or web address], offered by [facility], by registering with the following website: [email or web address]
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
## 2019-03-21 DIAGNOSIS — Z87.891 PERSONAL HISTORY OF NICOTINE DEPENDENCE: ICD-10-CM

## 2019-03-21 DIAGNOSIS — D47.3 ESSENTIAL (HEMORRHAGIC) THROMBOCYTHEMIA: ICD-10-CM

## 2019-03-21 DIAGNOSIS — J43.9 EMPHYSEMA, UNSPECIFIED: ICD-10-CM

## 2019-03-21 DIAGNOSIS — E78.1 PURE HYPERGLYCERIDEMIA: ICD-10-CM

## 2019-03-21 DIAGNOSIS — Z90.49 ACQUIRED ABSENCE OF OTHER SPECIFIED PARTS OF DIGESTIVE TRACT: ICD-10-CM

## 2019-03-21 DIAGNOSIS — K85.00 IDIOPATHIC ACUTE PANCREATITIS WITHOUT NECROSIS OR INFECTION: ICD-10-CM

## 2019-03-21 DIAGNOSIS — Z80.3 FAMILY HISTORY OF MALIGNANT NEOPLASM OF BREAST: ICD-10-CM

## 2019-03-21 DIAGNOSIS — Z80.0 FAMILY HISTORY OF MALIGNANT NEOPLASM OF DIGESTIVE ORGANS: ICD-10-CM

## 2019-03-21 DIAGNOSIS — E11.9 TYPE 2 DIABETES MELLITUS WITHOUT COMPLICATIONS: ICD-10-CM

## 2019-03-21 DIAGNOSIS — Z82.49 FAMILY HISTORY OF ISCHEMIC HEART DISEASE AND OTHER DISEASES OF THE CIRCULATORY SYSTEM: ICD-10-CM

## 2019-03-21 DIAGNOSIS — J45.909 UNSPECIFIED ASTHMA, UNCOMPLICATED: ICD-10-CM

## 2020-11-03 ENCOUNTER — EMERGENCY (EMERGENCY)
Facility: HOSPITAL | Age: 75
LOS: 1 days | Discharge: HOME | End: 2020-11-03
Attending: EMERGENCY MEDICINE | Admitting: EMERGENCY MEDICINE
Payer: SELF-PAY

## 2020-11-03 VITALS
WEIGHT: 128.09 LBS | OXYGEN SATURATION: 97 % | SYSTOLIC BLOOD PRESSURE: 165 MMHG | TEMPERATURE: 98 F | HEIGHT: 62 IN | HEART RATE: 73 BPM | RESPIRATION RATE: 16 BRPM | DIASTOLIC BLOOD PRESSURE: 87 MMHG

## 2020-11-03 VITALS
HEART RATE: 65 BPM | SYSTOLIC BLOOD PRESSURE: 147 MMHG | OXYGEN SATURATION: 98 % | TEMPERATURE: 98 F | DIASTOLIC BLOOD PRESSURE: 74 MMHG | RESPIRATION RATE: 18 BRPM

## 2020-11-03 DIAGNOSIS — Z90.49 ACQUIRED ABSENCE OF OTHER SPECIFIED PARTS OF DIGESTIVE TRACT: Chronic | ICD-10-CM

## 2020-11-03 LAB
ALBUMIN SERPL ELPH-MCNC: 3.9 G/DL — SIGNIFICANT CHANGE UP (ref 3.3–5)
ALP SERPL-CCNC: 78 U/L — SIGNIFICANT CHANGE UP (ref 40–120)
ALT FLD-CCNC: 16 U/L — SIGNIFICANT CHANGE UP (ref 10–45)
ANION GAP SERPL CALC-SCNC: 13 MMOL/L — SIGNIFICANT CHANGE UP (ref 5–17)
APTT BLD: 27.9 SEC — SIGNIFICANT CHANGE UP (ref 27.5–35.5)
AST SERPL-CCNC: 20 U/L — SIGNIFICANT CHANGE UP (ref 10–40)
BASOPHILS # BLD AUTO: 0.03 K/UL — SIGNIFICANT CHANGE UP (ref 0–0.2)
BASOPHILS NFR BLD AUTO: 0.4 % — SIGNIFICANT CHANGE UP (ref 0–2)
BILIRUB SERPL-MCNC: 0.3 MG/DL — SIGNIFICANT CHANGE UP (ref 0.2–1.2)
BUN SERPL-MCNC: 13 MG/DL — SIGNIFICANT CHANGE UP (ref 7–23)
CALCIUM SERPL-MCNC: 9.3 MG/DL — SIGNIFICANT CHANGE UP (ref 8.4–10.5)
CHLORIDE SERPL-SCNC: 101 MMOL/L — SIGNIFICANT CHANGE UP (ref 96–108)
CO2 SERPL-SCNC: 25 MMOL/L — SIGNIFICANT CHANGE UP (ref 22–31)
CREAT SERPL-MCNC: 0.55 MG/DL — SIGNIFICANT CHANGE UP (ref 0.5–1.3)
EOSINOPHIL # BLD AUTO: 0.18 K/UL — SIGNIFICANT CHANGE UP (ref 0–0.5)
EOSINOPHIL NFR BLD AUTO: 2.3 % — SIGNIFICANT CHANGE UP (ref 0–6)
GLUCOSE SERPL-MCNC: 276 MG/DL — HIGH (ref 70–99)
HCT VFR BLD CALC: 44.2 % — SIGNIFICANT CHANGE UP (ref 34.5–45)
HGB BLD-MCNC: 14.7 G/DL — SIGNIFICANT CHANGE UP (ref 11.5–15.5)
IMM GRANULOCYTES NFR BLD AUTO: 0.6 % — SIGNIFICANT CHANGE UP (ref 0–1.5)
INR BLD: 0.94 — SIGNIFICANT CHANGE UP (ref 0.88–1.16)
LYMPHOCYTES # BLD AUTO: 1.91 K/UL — SIGNIFICANT CHANGE UP (ref 1–3.3)
LYMPHOCYTES # BLD AUTO: 23.9 % — SIGNIFICANT CHANGE UP (ref 13–44)
MCHC RBC-ENTMCNC: 27.5 PG — SIGNIFICANT CHANGE UP (ref 27–34)
MCHC RBC-ENTMCNC: 33.3 GM/DL — SIGNIFICANT CHANGE UP (ref 32–36)
MCV RBC AUTO: 82.8 FL — SIGNIFICANT CHANGE UP (ref 80–100)
MONOCYTES # BLD AUTO: 0.63 K/UL — SIGNIFICANT CHANGE UP (ref 0–0.9)
MONOCYTES NFR BLD AUTO: 7.9 % — SIGNIFICANT CHANGE UP (ref 2–14)
NEUTROPHILS # BLD AUTO: 5.18 K/UL — SIGNIFICANT CHANGE UP (ref 1.8–7.4)
NEUTROPHILS NFR BLD AUTO: 64.9 % — SIGNIFICANT CHANGE UP (ref 43–77)
NRBC # BLD: 0 /100 WBCS — SIGNIFICANT CHANGE UP (ref 0–0)
PLATELET # BLD AUTO: 167 K/UL — SIGNIFICANT CHANGE UP (ref 150–400)
POTASSIUM SERPL-MCNC: 4.2 MMOL/L — SIGNIFICANT CHANGE UP (ref 3.5–5.3)
POTASSIUM SERPL-SCNC: 4.2 MMOL/L — SIGNIFICANT CHANGE UP (ref 3.5–5.3)
PROT SERPL-MCNC: 7.4 G/DL — SIGNIFICANT CHANGE UP (ref 6–8.3)
PROTHROM AB SERPL-ACNC: 11.3 SEC — SIGNIFICANT CHANGE UP (ref 10.6–13.6)
RBC # BLD: 5.34 M/UL — HIGH (ref 3.8–5.2)
RBC # FLD: 12.7 % — SIGNIFICANT CHANGE UP (ref 10.3–14.5)
SODIUM SERPL-SCNC: 139 MMOL/L — SIGNIFICANT CHANGE UP (ref 135–145)
WBC # BLD: 7.98 K/UL — SIGNIFICANT CHANGE UP (ref 3.8–10.5)
WBC # FLD AUTO: 7.98 K/UL — SIGNIFICANT CHANGE UP (ref 3.8–10.5)

## 2020-11-03 PROCEDURE — 74177 CT ABD & PELVIS W/CONTRAST: CPT

## 2020-11-03 PROCEDURE — 72125 CT NECK SPINE W/O DYE: CPT

## 2020-11-03 PROCEDURE — 73030 X-RAY EXAM OF SHOULDER: CPT

## 2020-11-03 PROCEDURE — 99285 EMERGENCY DEPT VISIT HI MDM: CPT

## 2020-11-03 PROCEDURE — 70450 CT HEAD/BRAIN W/O DYE: CPT | Mod: 26

## 2020-11-03 PROCEDURE — 80053 COMPREHEN METABOLIC PANEL: CPT

## 2020-11-03 PROCEDURE — 70450 CT HEAD/BRAIN W/O DYE: CPT

## 2020-11-03 PROCEDURE — 36415 COLL VENOUS BLD VENIPUNCTURE: CPT

## 2020-11-03 PROCEDURE — 72125 CT NECK SPINE W/O DYE: CPT | Mod: 26

## 2020-11-03 PROCEDURE — 74177 CT ABD & PELVIS W/CONTRAST: CPT | Mod: 26

## 2020-11-03 PROCEDURE — 85025 COMPLETE CBC W/AUTO DIFF WBC: CPT

## 2020-11-03 PROCEDURE — 85610 PROTHROMBIN TIME: CPT

## 2020-11-03 PROCEDURE — 99284 EMERGENCY DEPT VISIT MOD MDM: CPT | Mod: 25

## 2020-11-03 PROCEDURE — 73030 X-RAY EXAM OF SHOULDER: CPT | Mod: 26,RT

## 2020-11-03 PROCEDURE — 85730 THROMBOPLASTIN TIME PARTIAL: CPT

## 2020-11-03 RX ORDER — ACETAMINOPHEN 500 MG
650 TABLET ORAL ONCE
Refills: 0 | Status: COMPLETED | OUTPATIENT
Start: 2020-11-03 | End: 2020-11-03
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
## 2020-11-03 NOTE — ED PROVIDER NOTE - PROGRESS NOTE DETAILS
[place]: glucose 276, other labs grossly wnl. CT w/ no acute pathology. Discussed all results with patient, including any incidental radiological findings and lab abnormalities. Given copy of results and instructed to bring copy to primary doctor.   Pt feeling much better. abd soft ntnd. no spinal ttp, neck FROM --> c-collar removed.   Clinically no indication for further emergent ED workup or hospitalization at this time. Comfortable for dc, outpt f/u.

## 2020-11-03 NOTE — ED ADULT NURSE NOTE - NSFALLRSKINDICATORS_ED_ALL_ED
53 y/o M with pancreatitis  -NPO/IVF  -pain/nausea control  -Am labs  -SQH/SCDS  -ISS  -metoprolol  -IS  -f/u Romonello for preop clearance 51 y/o M with pancreatitis  - CLD today  -pain/nausea control  -Am labs  -SQH/SCDS  -ISS  - home meds  -IS  -f/u [facility] for preop clearance  - OR Tuesday: distal pancreatectomy/splenectomy no

## 2020-11-03 NOTE — ED PROVIDER NOTE - PHYSICAL EXAMINATION
abd: +mild RLQ ttp, no rebound/guarding. minimally decreased ROM R hip and R shoulder.   pt in c-collar: has minimal cervical spinal and paraspinal ttp.   No other spinal ttp, Strength 5/5. No other bony ttp, FROM all other extremities. Normal equal distal pulses.

## 2020-11-03 NOTE — ED PROVIDER NOTE - PATIENT PORTAL LINK FT
You can access the FollowMyHealth Patient Portal offered by [facility] by registering at the following website: [email or web address]. By joining [employer]’s FollowMyHealth portal, you will also be able to view your health information using other applications (apps) compatible with our system.
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
## 2020-11-04 PROBLEM — J43.9 EMPHYSEMA, UNSPECIFIED: Chronic | Status: ACTIVE | Noted: 2019-03-16

## 2020-11-04 PROBLEM — E11.9 TYPE 2 DIABETES MELLITUS WITHOUT COMPLICATIONS: Chronic | Status: ACTIVE | Noted: 2019-03-16

## 2020-11-04 PROBLEM — N20.0 CALCULUS OF KIDNEY: Chronic | Status: ACTIVE | Noted: 2019-03-16
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
## 2020-11-07 DIAGNOSIS — R10.31 RIGHT LOWER QUADRANT PAIN: ICD-10-CM

## 2020-11-07 DIAGNOSIS — S09.90XA UNSPECIFIED INJURY OF HEAD, INITIAL ENCOUNTER: ICD-10-CM
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
## 2020-11-07 DIAGNOSIS — R51.9 HEADACHE, UNSPECIFIED: ICD-10-CM
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
## 2022-07-18 NOTE — ED ADULT NURSE NOTE - IN THE PAST 12 MONTHS HAVE YOU USED DRUGS OTHER THAN THOSE REQUIRED FOR MEDICAL REASON?
Review of Systems Health Update    Patient: Maureen Jett         : 1958     Please fill out the All Patient section and any section below that pertains to your visit today.     ALL PATIENTS:  NO    Chest Pain  YES   New shortness of breath  NO Unexplained weight change      DIABETES:  NO   Excessive Thirst, urination or hunger  NO   Repeated infections/Yeast infections  NO    Numbness tingling in hands or feet  NO    History of pancreatitis  NO    History of gastroparesis (Slowed stomach emptying)  YES   History of heart problem    WEIGHT MANAGEMENT:  NO    Stress eating   NO   New joint pain/redness  YES   Dizziness/Lightheadedness  YES   Leg Cramps  NO Worsening depression/Anxiety  NO Heartburn  NO Constipation/Diarrhea  NO   Hair Loss  NO   New Kidney stones  NO   Rash No

## 2023-11-18 NOTE — ED ADULT NURSE NOTE - IN THE PAST 12 MONTHS HAVE YOU USED DRUGS OTHER THAN THOSE REQUIRED FOR MEDICAL REASON?
Pt resting in chair during shift change; denies pain. Very pleasant overnight; call light within reach.    Problem: At Risk for Falls  Goal: # Patient does not fall  Outcome: Outcome Met, Continue evaluating goal progress toward completion  Goal: # Takes action to control fall-related risks  Outcome: Outcome Met, Continue evaluating goal progress toward completion     Problem: Pain  Goal: #Acceptable pain level achieved/maintained at rest using NRS/Faces  Description: This goal is used for patients who can self-report.  Acceptable means the level is at or below the identified comfort/function goal.  Outcome: Outcome Met, Continue evaluating goal progress toward completion  Goal: # Acceptable pain level achieved/maintained with activity using NRS/Faces  Description: This goal is used for patients who can self-report and are not achieving acceptable pain control during activity.  Outcome: Outcome Met, Continue evaluating goal progress toward completion      Yes

## 2025-01-28 NOTE — ED ADULT TRIAGE NOTE - PAIN RATING/NUMBER SCALE (0-10): ACTIVITY
----- Message from Ben sent at 1/28/2025  9:57 AM CST -----  Regarding: Reschedule Appt.  Type:  Reschedule Appointment Request    Caller is requesting to reschedule an appointment.      Name of Caller:pt     Date of previous appointment?2/10    Symptoms: 6 mon fu     Would the patient rather a call back or a response via [facility]? Call back     Best Call Back Number:[phone]      Additional Information: pt is having trouble getting transportation for in person appt and needs, a audio appt rescheduled for a Tuesday or Thursday,  please call to advise, Thank You.  
Phoned patient no answer left message on voicemail to give the office a call back.  
Spoke with patient audio visit rescheduled to 2/11. Patient has another appointment on 2/10  
7